# Patient Record
(demographics unavailable — no encounter records)

---

## 2022-07-23 LAB
ABO + RH BLD: ABNORMAL
ABO/RH METHOD: NORMAL
ANTIBODY SCREEN METHOD: NORMAL
APPEARANCE UR: CLEAR
BILIRUB UR STRIP.AUTO-MCNC: NEGATIVE MG/DL
BLD GP AB SCN SERPL QL: NEGATIVE
COLOR UR: NORMAL
ERYTHROCYTE [DISTWIDTH] IN BLOOD BY AUTOMATED COUNT: 12.4 % (ref 11–16)
GLUCOSE UR STRIP.AUTO-MCNC: NEGATIVE MG/DL
HCT VFR BLD AUTO: 34.3 % (ref 34–47)
HGB BLD-MCNC: 11.2 G/DL (ref 11.5–15.7)
KETONES UR STRIP.AUTO-MCNC: NEGATIVE MG/DL
LEUKOCYTE ESTERASE UR QL STRIP: NEGATIVE
MCH RBC QN AUTO: 27.1 PG (ref 27–34.5)
MCHC RBC AUTO-ENTMCNC: 32.7 G/DL (ref 32–36)
MCV RBC AUTO: 83.1 FL (ref 81–99)
NITRITE UR QL STRIP.AUTO: NEGATIVE
PH UR STRIP.AUTO: 7 [PH] (ref 4.5–8)
PLATELET # BLD AUTO: 241 X10E3/MCL (ref 140–440)
PMV BLD AUTO: 11.3 FL (ref 7.2–13.2)
POS CONTROL FSC: ABNORMAL
PROT UR QL STRIP: NEGATIVE
RBC # BLD AUTO: 4.13 X10E6/MCL (ref 3.6–5.2)
RBC # UR STRIP: NEGATIVE /UL
SP GR UR STRIP: 1.01 (ref 1–1.03)
UROBILINOGEN UR STRIP-MCNC: 0.2 EU/DL
WBC # BLD AUTO: 9.6 X10E3/MCL (ref 3.8–10.6)

## 2022-07-25 LAB
HCT VFR BLD AUTO: 38 % (ref 34–47)
HGB BLD-MCNC: 12.4 G/DL (ref 11.5–15.7)

## 2022-10-18 NOTE — DISCHARGE SUMMARY
[address] [place] [address] 399  Post-Acute Care Transfer Instructions  PERSON INFORMATION  Name: Elsie Headley  MRN: [id number]    FIN#: [id number]  PHYSICIANS  Admitting Physician: Stephie MOREAU  Attending Physician: Stephie MOREAU  PCP: Stephie MOREAU  Discharge Diagnosis:  37 weeks gestation of pregnancy; Abnormal fetal ultrasound; Intrauterine growth restriction (IUGR) affecting care of mother, third trimester, single gestation;  (normal spontaneous vaginal delivery)  Comment:     PATIENT EDUCATION INFORMATION  Instructions:    Medication Leaflets: Follow-up:              With: Address: When:   see primary ob in 6w  In 1 day                       Type Location Start [facility] Cervidil Induction - Insertion  L&D  10:00 PM  10:30 PM Confirmed   -OB Cervidil Induction - Induction  L&D  6:00 AM  6:30 AM Confirmed          MEDICATION LIST  Medication Reconciliation at Discharge:          [id number], [address] [place] [address] [phone], [phone]  ibuprofen (ibuprofen 800 mg oral tablet) 1 Tabs Oral (given by mouth) every 8 hours (interval). Refills: 0. Last Dose:____________________  Medications that have not changed  Other Medications  multivitamin, prenatal (Prenatal 1) 1 Tabs Oral (given by mouth) every day. Last Dose:____________________  No Longer Take the Following Medications  metroNIDAZOLE (metroNIDAZOLE 500 mg oral tablet) 1 Tabs Oral (given by mouth) every 12 hours for 14 Days. Refills: 0. Stop Taking Reason: Physician Request  nitrofurantoin (Macrobid 100 mg oral capsule) 1 Capsules Oral (given by mouth) 2 times a day for 7 Days. Refills: 0.   Stop Taking Reason: Physician Request         Patients Final Home Medication List Upon Discharge:          ibuprofen (ibuprofen 800 mg oral tablet) 1 Tabs Oral (given by mouth) every 8 hours (interval). Refills: 0.  multivitamin, prenatal (Prenatal 1) 1 Tabs Oral (given by mouth) every day.          Comment:     ORDERS          Order Name Order Details

## 2022-10-18 NOTE — ANESTHESIA POSTPROCEDURE EVALUATION
post op ob        Patient:   Juan Manuel Howard            MRN: [id number]            FIN: [id number]               Age:   21 years     Sex:  Female     :  1998   Associated Diagnoses:   None   Author:   Ranjan ALVARES      Postoperative Information   Post Operative Info:          Post operative day: Day 1. Patient location: pts room.        Assessment   Postanesthesia assessment   Vitals: Vital Signs   6820 1:04 EDT Systolic Blood Pressure 272 mmHg    Diastolic Blood Pressure 75 mmHg    Temperature Oral 36.7 degC    Heart Rate Monitored 90 bpm    Mean Arterial Pressure, Cuff 84 mmHg    SpO2 99 %   4995 78:64 EDT Systolic Blood Pressure 986 mmHg    Diastolic Blood Pressure 57 mmHg  LOW    Heart Rate Monitored 100 bpm    Mean Arterial Pressure, Cuff 78 mmHg    01:38 EDT Systolic Blood Pressure 867 mmHg    Diastolic Blood Pressure 75 mmHg    Heart Rate Monitored 89 bpm    Mean Arterial Pressure, Cuff 86 mmHg    74:84 EDT Systolic Blood Pressure 171 mmHg    Diastolic Blood Pressure 48 mmHg  <LLOW    Heart Rate Monitored 93 bpm    Mean Arterial Pressure, Cuff 66 mmHg  LOW    09:16 EDT Systolic Blood Pressure 195 mmHg    Diastolic Blood Pressure 71 mmHg    Heart Rate Monitored 103 bpm  HI    Mean Arterial Pressure, Cuff 83 mmHg   3805 05:89 EDT Systolic Blood Pressure 787 mmHg    Diastolic Blood Pressure 67 mmHg    Heart Rate Monitored 95 bpm    Mean Arterial Pressure, Cuff 83 mmHg   3/94/4057 79:00 EDT Systolic Blood Pressure 949 mmHg    Diastolic Blood Pressure 68 mmHg    Heart Rate Monitored 115 bpm  HI    Respiratory Rate 18 br/min    Mean Arterial Pressure, Cuff 85 mmHg    20:13 EDT Systolic Blood Pressure 304 mmHg    Diastolic Blood Pressure 62 mmHg    Heart Rate Monitored 122 bpm  HI    Mean Arterial Pressure, Cuff 82 mmHg   3/67/5134 44:41 EDT Systolic Blood Pressure 942 mmHg    Diastolic Blood Pressure 64 mmHg    Heart Rate Monitored 164 bpm  >HHI    Mean Arterial Pressure, Cuff 85 mmHg   0/06/0756 14:96 EDT Systolic Blood Pressure 412 mmHg    Diastolic Blood Pressure 77 mmHg    Heart Rate Monitored 108 bpm  HI    Mean Arterial Pressure, Cuff 96 mmHg   4/41/9673 77:88 EDT Systolic Blood Pressure 507 mmHg    Diastolic Blood Pressure 81 mmHg    Temperature Oral 36.8 degC    Heart Rate Monitored 113 bpm  HI    Mean Arterial Pressure, Cuff 92 mmHg   3/29/0131 32:52 EDT Systolic Blood Pressure 728 mmHg    Diastolic Blood Pressure 73 mmHg    Heart Rate Monitored 102 bpm  HI    Mean Arterial Pressure, Cuff 85 mmHg   9/63/4744 22:73 EDT Systolic Blood Pressure 150 mmHg    Diastolic Blood Pressure 64 mmHg    Heart Rate Monitored 93 bpm    Mean Arterial Pressure, Cuff 79 mmHg   6/61/9020 76:85 EDT Systolic Blood Pressure 974 mmHg    Diastolic Blood Pressure 73 mmHg    Heart Rate Monitored 111 bpm  HI    Mean Arterial Pressure, Cuff 89 mmHg   7/59/9290 58:81 EDT Systolic Blood Pressure 838 mmHg    Diastolic Blood Pressure 68 mmHg    Temperature Oral 36.9 degC    Heart Rate Monitored 103 bpm  HI    Mean Arterial Pressure, Cuff 88 mmHg   3/46/3085 24:48 EDT Systolic Blood Pressure 110 mmHg    Diastolic Blood Pressure 72 mmHg    Heart Rate Monitored 109 bpm  HI    Mean Arterial Pressure, Cuff 88 mmHg   1/36/8229 54:18 EDT Systolic Blood Pressure 012 mmHg    Diastolic Blood Pressure 64 mmHg    Heart Rate Monitored 103 bpm  HI    Mean Arterial Pressure, Cuff 86 mmHg   9/44/9016 91:64 EDT Systolic Blood Pressure 749 mmHg    Diastolic Blood Pressure 68 mmHg    Heart Rate Monitored 110 bpm  HI    Mean Arterial Pressure, Cuff 85 mmHg   1/88/6728 30:55 EDT Systolic Blood Pressure 969 mmHg    Diastolic Blood Pressure 62 mmHg    Heart Rate Monitored 99 bpm    Respiratory Rate 18 br/min    Mean Arterial Pressure, Cuff 77 mmHg   4/49/6665 16:88 EDT Systolic Blood Pressure 654 mmHg    Diastolic Blood Pressure 71 mmHg    Heart Rate Monitored 90 bpm    Mean Arterial Pressure, Cuff 84 mmHg   0/83/1035 43:14 EDT Systolic Blood Pressure 889 mmHg    Diastolic Blood Pressure 55 mmHg  LOW    Heart Rate Monitored 85 bpm    Mean Arterial Pressure, Cuff 73 mmHg   5/56/6627 83:03 EDT Systolic Blood Pressure 424 mmHg    Diastolic Blood Pressure 56 mmHg  LOW    Heart Rate Monitored 109 bpm  HI    Respiratory Rate 18 br/min    Mean Arterial Pressure, Cuff 66 mmHg  LOW   1/92/7701 90:45 EDT Systolic Blood Pressure 97 mmHg    Diastolic Blood Pressure 57 mmHg  LOW    Heart Rate Monitored 103 bpm  HI    Mean Arterial Pressure, Cuff 70 mmHg   0/24/1890 73:30 EDT Systolic Blood Pressure 172 mmHg    Diastolic Blood Pressure 56 mmHg  LOW    Heart Rate Monitored 90 bpm    Mean Arterial Pressure, Cuff 76 mmHg   2/80/4194 84:47 EDT Systolic Blood Pressure 465 mmHg    Diastolic Blood Pressure 84 mmHg    Temperature Oral 36.7 degC    Heart Rate Monitored 113 bpm  HI    Mean Arterial Pressure, Cuff 97 mmHg   7/57/2724 75:65 EDT Systolic Blood Pressure 982 mmHg    Diastolic Blood Pressure 70 mmHg    Heart Rate Monitored 100 bpm    Respiratory Rate 18 br/min    Mean Arterial Pressure, Cuff 88 mmHg   9/58/6985 23:81 EDT Systolic Blood Pressure 393 mmHg    Diastolic Blood Pressure 70 mmHg    Heart Rate Monitored 83 bpm    Mean Arterial Pressure, Cuff 89 mmHg   3/35/5652 79:57 EDT Systolic Blood Pressure 789 mmHg    Diastolic Blood Pressure 67 mmHg    Heart Rate Monitored 90 bpm    Mean Arterial Pressure, Cuff 87 mmHg   7/01/6257 88:01 EDT Systolic Blood Pressure 221 mmHg    Diastolic Blood Pressure 67 mmHg    Heart Rate Monitored 82 bpm    Mean Arterial Pressure, Cuff 85 mmHg   7/42/2147 73:01 EDT Systolic Blood Pressure 707 mmHg    Diastolic Blood Pressure 66 mmHg    Heart Rate Monitored 82 bpm    Respiratory Rate 18 br/min    Mean Arterial Pressure, Cuff 83 mmHg   9/16/0921 88:95 EDT Systolic Blood Pressure 518 mmHg    Diastolic Blood Pressure 75 mmHg    Heart Rate Monitored 86 bpm    Mean Arterial Pressure, Cuff 91 mmHg   3/34/0999 56:91 EDT Systolic Blood Pressure 708 mmHg    Diastolic Blood Pressure 73 mmHg    Heart Rate Monitored 86 bpm    Mean Arterial Pressure, Cuff 88 mmHg   2/36/0771 80:26 EDT Systolic Blood Pressure 224 mmHg    Diastolic Blood Pressure 74 mmHg    Heart Rate Monitored 85 bpm    Mean Arterial Pressure, Cuff 93 mmHg   6/62/3706 46:27 EDT Systolic Blood Pressure 385 mmHg    Diastolic Blood Pressure 72 mmHg    Heart Rate Monitored 100 bpm    Respiratory Rate 18 br/min    Mean Arterial Pressure, Cuff 92 mmHg   2/16/3393 19:59 EDT Systolic Blood Pressure 461 mmHg    Diastolic Blood Pressure 62 mmHg    Heart Rate Monitored 86 bpm    Mean Arterial Pressure, Cuff 80 mmHg   6/26/0755 09:74 EDT Systolic Blood Pressure 065 mmHg    Diastolic Blood Pressure 66 mmHg    Heart Rate Monitored 80 bpm    Mean Arterial Pressure, Cuff 81 mmHg   9/69/7207 03:97 EDT Systolic Blood Pressure 097 mmHg    Diastolic Blood Pressure 66 mmHg    Heart Rate Monitored 100 bpm    Mean Arterial Pressure, Cuff 81 mmHg   7/24/2022 13:47 EDT Temperature Oral 37.2 degC   3/83/1056 35:45 EDT Systolic Blood Pressure 092 mmHg    Diastolic Blood Pressure 70 mmHg    Heart Rate Monitored 104 bpm  HI    Respiratory Rate 18 br/min    Mean Arterial Pressure, Cuff 85 mmHg   4/71/4577 83:17 EDT Systolic Blood Pressure 369 mmHg    Diastolic Blood Pressure 65 mmHg    Heart Rate Monitored 98 bpm    Respiratory Rate 18 br/min    Mean Arterial Pressure, Cuff 84 mmHg   5/85/1832 86:85 EDT Systolic Blood Pressure 551 mmHg    Diastolic Blood Pressure 65 mmHg    Heart Rate Monitored 103 bpm  HI    Respiratory Rate 18 br/min    Mean Arterial Pressure, Cuff 81 mmHg   7/16/6586 22:28 EDT Systolic Blood Pressure 726 mmHg    Diastolic Blood Pressure 69 mmHg    Heart Rate Monitored 104 bpm  HI    Respiratory Rate 18 br/min    Mean Arterial Pressure, Cuff 82 mmHg   3/74/8660 83:89 EDT Systolic Blood Pressure 148 mmHg    Diastolic Blood Pressure 73 mmHg    Heart Rate Monitored 107 bpm  HI    Respiratory Rate 18 br/min    Mean Arterial Pressure, Cuff 86 mmHg   7/31/3856 24:31 EDT Systolic Blood Pressure 332 mmHg    Diastolic Blood Pressure 64 mmHg    Heart Rate Monitored 96 bpm    Respiratory Rate 18 br/min    Mean Arterial Pressure, Cuff 80 mmHg   6/07/9023 09:13 EDT Systolic Blood Pressure 213 mmHg    Diastolic Blood Pressure 67 mmHg    Heart Rate Monitored 96 bpm    Respiratory Rate 18 br/min    Mean Arterial Pressure, Cuff 86 mmHg   5/64/5665 88:29 EDT Systolic Blood Pressure 853 mmHg    Diastolic Blood Pressure 75 mmHg    Heart Rate Monitored 104 bpm  HI    Respiratory Rate 18 br/min    Mean Arterial Pressure, Cuff 89 mmHg   8/80/9945 33:38 EDT Systolic Blood Pressure 088 mmHg    Diastolic Blood Pressure 85 mmHg    Heart Rate Monitored 82 bpm    Mean Arterial Pressure, Cuff 99 mmHg   9/13/2023 01:39 EDT Systolic Blood Pressure 216 mmHg    Diastolic Blood Pressure 85 mmHg    Heart Rate Monitored 82 bpm    Respiratory Rate 18 br/min    Mean Arterial Pressure, Cuff 99 mmHg   2/86/8422 60:17 EDT Systolic Blood Pressure 805 mmHg    Diastolic Blood Pressure 76 mmHg    Heart Rate Monitored 93 bpm    Mean Arterial Pressure, Cuff 93 mmHg   7/24/2022 11:58 EDT Temperature Axillary 36.1 degC   8/65/9247 40:62 EDT Systolic Blood Pressure 558 mmHg    Diastolic Blood Pressure 76 mmHg    Heart Rate Monitored 95 bpm    Respiratory Rate 18 br/min    Mean Arterial Pressure, Cuff 89 mmHg   3/86/1537 43:12 EDT Systolic Blood Pressure 344 mmHg    Diastolic Blood Pressure 76 mmHg    Heart Rate Monitored 95 bpm    Mean Arterial Pressure, Cuff 89 mmHg   6/13/7984 51:15 EDT Systolic Blood Pressure 055 mmHg    Diastolic Blood Pressure 90 mmHg    Heart Rate Monitored 106 bpm  HI    Respiratory Rate 18 br/min    Mean Arterial Pressure, Cuff 104 mmHg  HI   9/91/7817 20:66 EDT Systolic Blood Pressure 710 mmHg    Diastolic Blood Pressure 69 mmHg    Heart Rate Monitored 106 bpm  HI    Respiratory Rate 18 br/min    Mean Arterial Pressure, Cuff 90 mmHg   8/92/8962 4:24 EDT Systolic Blood Pressure 083 mmHg    Diastolic Blood Pressure 63 mmHg    Temperature Oral 36.8 degC    Heart Rate Monitored 85 bpm    Respiratory Rate 18 br/min    Mean Arterial Pressure, Cuff 82 mmHg   4/19/4954 4:99 EDT Systolic Blood Pressure 635 mmHg    Diastolic Blood Pressure 79 mmHg    Heart Rate Monitored 87 bpm    Respiratory Rate 18 br/min    Mean Arterial Pressure, Cuff 92 mmHg   3/45/8977 9:05 EDT Systolic Blood Pressure 203 mmHg    Diastolic Blood Pressure 67 mmHg    Heart Rate Monitored 98 bpm    Mean Arterial Pressure, Cuff 83 mmHg   6/92/6576 2:31 EDT Systolic Blood Pressure 922 mmHg    Diastolic Blood Pressure 62 mmHg    Heart Rate Monitored 88 bpm    Mean Arterial Pressure, Cuff 79 mmHg   7/37/0607 4:97 EDT Systolic Blood Pressure 560 mmHg    Diastolic Blood Pressure 60 mmHg    Heart Rate Monitored 85 bpm    Mean Arterial Pressure, Cuff 76 mmHg   7/13/3487 1:76 EDT Systolic Blood Pressure 867 mmHg    Diastolic Blood Pressure 59 mmHg  LOW    Heart Rate Monitored 102 bpm  HI    Mean Arterial Pressure, Cuff 77 mmHg   4/38/4892 3:45 EDT Systolic Blood Pressure 513 mmHg    Diastolic Blood Pressure 60 mmHg    Heart Rate Monitored 99 bpm    Mean Arterial Pressure, Cuff 78 mmHg   7/24/2022 1:23 EDT Temperature Oral 36.6 degC   5/69/4109 3:53 EDT Systolic Blood Pressure 289 mmHg    Diastolic Blood Pressure 64 mmHg    Heart Rate Monitored 89 bpm    Mean Arterial Pressure, Cuff 81 mmHg   6/22/4683 0:56 EDT Systolic Blood Pressure 94 mmHg    Diastolic Blood Pressure 55 mmHg  LOW    Heart Rate Monitored 83 bpm    Mean Arterial Pressure, Cuff 69 mmHg  LOW      . Respiratory function: Respiratory rate, airway, and oxygen saturation are at adequate levels. Cardiovascular function: Heart Rate stable, Blood Pressure stable, Postoperative hydration status Adequate. Mental status: appropriate for level of anesthesia.      Temperature: within normal limits. Pain Control: Adequate. Nausea/Vomiting: Absent. Notes: pt satisfied with neuaxial anesthetic  bilat lower extrems at baseline strength and sensation.      Signature Line     Electronically Signed on 07/25/2022 06:45 AM EDT   ________________________________________________   Jennifer ALVARES

## 2022-10-18 NOTE — ANESTHESIA PREPROCEDURE EVALUATION
Preanesthesia Evaluation        Patient:   Odette Castaneda            MRN: [id number]            FIN: [id number]               Age:   21 years     Sex:  Female     :  1998   Associated Diagnoses:   None   Author:   Ebony ALVARES      Preoperative Information   Anesthesia history     Patient's history: negative. Family's history: negative. History of Present Illness   The patient presents for preanesthesia evaluation with diagnosis and symptoms as documented in full proceduralist history and physical.        Health Status   Allergies: Allergic Reactions (Selected)  Unknown  Penicillins- Unknown.,    Allergies    (Active and Proposed Allergies Only)  penicillins   (Severity: Unknown, Onset: Unknown)   Reactions: Unknown     Current medications:    Home Medications (3) Active  Macrobid 100 mg oral capsule 100 mg = 1 caps, Oral, BID  metroNIDAZOLE 500 mg oral tablet 500 mg = 1 tabs, Oral, q12hr  Prenatal 1 1 tabs, Oral, Daily  ,    Medications (28) Active  Scheduled: (8)  acetaminophen 325 mg Tab  650 mg 2 tabs, Oral, On Call  acetaminophen 500 mg Tab  1,000 mg 2 tabs, Oral, On Call  atropine-diphenoxylate 0.025 mg-2.5 mg Tab  2 tabs, Oral, On Call  atropine-diphenoxylate 0.025 mg-2.5 mg Tab  1 tabs, Oral, On Call  azithromycin 500mg/250ml NS ADM  500 mg 250 mL, IV Piggyback, On Call  oxytocin 30 units + premix sodium chloride 0.9%. .... 500 mL  500 mL, IV Bolus  promethazine 25 mg/mL Inj Soln 1 mL  50 mg 2 mL, IM, On Call  Sodium Chloride 0.9% intravenous solution 1,000 mL  1,000 mL, IV  Continuous: (3)  Dextrose 5% with 0.9% NaCl intravenous solution 1,000 mL  1,000 mL, IV, 125 mL/hr  oxytocin 30 units + premix sodium chloride 0.9%. .... 500 mL  500 mL, IV  oxytocin 30 units + premix sodium chloride 0.9%. ....  500 mL  500 mL, IV  PRN: (17)  acetaminophen 325 mg Tab  650 mg 2 tabs, Oral, q4hr  acetaminophen 500 mg Tab  1,000 mg 2 tabs, Oral, q6hr  aluminum hydroxide/magnesium hydroxide/simethicone 200 mg-200 mg-20 mg/5 mL  30 mL, Oral, q6hr  ammonia aromatic Solution  1 EA, Inhale, On Call  carboprost 250 mcg/mL Inj Soln 1 mL  250 mcg 1 mL, IM, On Call  lidocaine 1% Inj Soln 50 mL  50 mL, IM, On Call  methylergonovine 0.2 mg Tab  0.2 mg 1 tabs, Oral, q4hr  methylergonovine 0.2 mg/mL Inj Soln 1 mL  0.2 mg 1 mL, IM, On Call  mineral oil Oral Liquid 30 mL  30 mL, Topical, On Call  miSOPROStol  200 mcg Tab  800 mcg 4 tabs, OH, On Call  nalbuphine 10 mg/mL Inj Soln 1 mL  5 mg 0.5 mL, IV Push, q3hr  NIFEdipine 10 mg Cap  10 mg 1 caps, Oral, On Call  ondansetron 2 mg/mL Inj Soln 2 mL  4 mg 2 mL, IV Push, q6hr  oxytocin 10 units/mL Inj Soln 1 mL  10 units 1 mL, IM, On Call  silver nitrate Stick  1 noemi, Topical, On Call  terbutaline 1 mg/mL Inj Soln 1 mL  0.25 mg 0.25 mL, Subcutaneous, q20min  tranexamic acid 1000mg/ 100 mL NS ADM + Premix ADM sodium chloride inj. .. Iram Bloodgood Iram Bloodgood Iram Bloodgood 100 mL  1,000 mg 100 mL, IV Piggyback, On Call     Problem list:    Active Problems (3)  Bipolar 1 disorder   Fetal growth restriction   Pregnant   ,    Problems   (Active Problems Only)    Fetal growth restriction (Related to Pregnant problem)  (SNOMED CT: 1555516934, Onset: 07/20/22)   Comment:  Problem added by Discern Expert  ([address] on 07/20/22)   Pregnant   (SNOMED CT: 571364506, Onset: 02/01/22)  Bipolar I disorder   (SNOMED CT: 0549508903, Onset: --)        Histories   Past Medical History:    No active or resolved past medical history items have been selected or recorded. Procedure history:    Tooth extraction ([id number]). Comments:  7/23/2022 19:51 [place] - Caridad Barrios-RN  wisdom tooth removal   Social History        Social & Psychosocial Habits    Alcohol  03/24/2022  Use: Denies    Substance Use  03/24/2022  Use: Denies    Tobacco  03/24/2022  Use: Never (less than 100 in l    Electronic Cigarette/Vaping  07/23/2022  Electronic Cigarette Use: Former use, quit more tee    Number of Years: 6  . Symptoms of Sleep Apnea Score: PAT Documentation   0 7/23/2022 19:47 EDT   0 3/24/2022 12:11 EDT      PONV Risk Score: PAT Documentation   3 7/23/2022 19:59 EDT   3 3/24/2022 12:22 EDT         Physical Examination   Vital Signs   7/24/2022 11:58 EDT Temperature Axillary 36.1 degC   3/83/8942 20:63 EDT Systolic Blood Pressure 847 mmHg    Diastolic Blood Pressure 76 mmHg    Heart Rate Monitored 95 bpm    Respiratory Rate 18 br/min    Mean Arterial Pressure, Cuff 89 mmHg   7/37/7743 33:55 EDT Systolic Blood Pressure 587 mmHg    Diastolic Blood Pressure 76 mmHg    Heart Rate Monitored 95 bpm    Mean Arterial Pressure, Cuff 89 mmHg   8/15/1494 53:74 EDT Systolic Blood Pressure 271 mmHg    Diastolic Blood Pressure 90 mmHg    Heart Rate Monitored 106 bpm  HI    Respiratory Rate 18 br/min    Mean Arterial Pressure, Cuff 104 mmHg  HI   8/49/7322 13:65 EDT Systolic Blood Pressure 573 mmHg    Diastolic Blood Pressure 69 mmHg    Heart Rate Monitored 106 bpm  HI    Respiratory Rate 18 br/min    Mean Arterial Pressure, Cuff 90 mmHg   0/02/5458 1:21 EDT Systolic Blood Pressure 972 mmHg    Diastolic Blood Pressure 63 mmHg    Temperature Oral 36.8 degC    Heart Rate Monitored 85 bpm    Respiratory Rate 18 br/min    Mean Arterial Pressure, Cuff 82 mmHg   5/89/6713 1:13 EDT Systolic Blood Pressure 684 mmHg    Diastolic Blood Pressure 79 mmHg    Heart Rate Monitored 87 bpm    Respiratory Rate 18 br/min    Mean Arterial Pressure, Cuff 92 mmHg   0/86/6178 8:03 EDT Systolic Blood Pressure 468 mmHg    Diastolic Blood Pressure 67 mmHg    Heart Rate Monitored 98 bpm    Mean Arterial Pressure, Cuff 83 mmHg   6/44/4142 6:26 EDT Systolic Blood Pressure 569 mmHg    Diastolic Blood Pressure 62 mmHg    Heart Rate Monitored 88 bpm    Mean Arterial Pressure, Cuff 79 mmHg   6/48/6510 6:34 EDT Systolic Blood Pressure 357 mmHg    Diastolic Blood Pressure 60 mmHg    Heart Rate Monitored 85 bpm    Mean Arterial Pressure, Cuff 76 mmHg   7/24/2022 3:19 EDT Systolic Blood Pressure 389 mmHg    Diastolic Blood Pressure 59 mmHg  LOW    Heart Rate Monitored 102 bpm  HI    Mean Arterial Pressure, Cuff 77 mmHg   3/82/8963 4:61 EDT Systolic Blood Pressure 485 mmHg    Diastolic Blood Pressure 60 mmHg    Heart Rate Monitored 99 bpm    Mean Arterial Pressure, Cuff 78 mmHg   7/24/2022 1:23 EDT Temperature Oral 36.6 degC   9/40/0203 4:08 EDT Systolic Blood Pressure 765 mmHg    Diastolic Blood Pressure 64 mmHg    Heart Rate Monitored 89 bpm    Mean Arterial Pressure, Cuff 81 mmHg   2/13/2185 1:34 EDT Systolic Blood Pressure 94 mmHg    Diastolic Blood Pressure 55 mmHg  LOW    Heart Rate Monitored 83 bpm    Mean Arterial Pressure, Cuff 69 mmHg  LOW   5/89/0248 57:09 EDT Systolic Blood Pressure 735 mmHg    Diastolic Blood Pressure 62 mmHg    Heart Rate Monitored 96 bpm    Mean Arterial Pressure, Cuff 83 mmHg   3/06/0536 34:28 EDT Systolic Blood Pressure 462 mmHg    Diastolic Blood Pressure 68 mmHg    Heart Rate Monitored 84 bpm    Mean Arterial Pressure, Cuff 86 mmHg   3/83/4133 91:39 EDT Systolic Blood Pressure 182 mmHg    Diastolic Blood Pressure 65 mmHg    Heart Rate Monitored 82 bpm    Mean Arterial Pressure, Cuff 85 mmHg   2/04/8964 18:27 EDT Systolic Blood Pressure 066 mmHg    Diastolic Blood Pressure 69 mmHg    Heart Rate Monitored 94 bpm    Mean Arterial Pressure, Cuff 85 mmHg   0/27/7433 50:63 EDT Systolic Blood Pressure 398 mmHg    Diastolic Blood Pressure 71 mmHg    Heart Rate Monitored 88 bpm    Mean Arterial Pressure, Cuff 87 mmHg   6/05/4233 00:65 EDT Systolic Blood Pressure 774 mmHg    Diastolic Blood Pressure 70 mmHg    Heart Rate Monitored 85 bpm    Mean Arterial Pressure, Cuff 88 mmHg   9/34/3350 58:54 EDT Systolic Blood Pressure 715 mmHg    Diastolic Blood Pressure 71 mmHg    Temperature Axillary 37.0 degC  HI    Heart Rate Monitored 101 bpm  HI    Respiratory Rate 16 br/min    Mean Arterial Pressure, Cuff 93 mmHg         Vital Signs (last 24 hrs)_____  Last Charted___________  Temp Axillary     36.1 degC  (JUL 24 11:58)  Resp Rate         18 br/min  (JUL 24 11:30)  SBP      112 mmHg  (JUL 24 11:30)  DBP      76 mmHg  (JUL 24 11:30)  Weight      92 kg  (JUL 23 19:45)  Height      165 cm  (JUL 23 19:45)  BMI      33.79  (JUL 23 19:45)     Measurements from flowsheet : Measurements   7/23/2022 19:45 EDT Height/Length Measured 165 cm    Weight Measured 92 kg    Weight Dosing 92 kg    Pre-Pregnancy Weight 83.5 kg    Cumulative Weight Gain 9 kg    Body Mass Index Measured 33.79 kg/m2      Pain assessment:  Pain Assessment   7/24/2022 11:30 EDT Numeric Rating Pain Scale 0 = No pain    Pasero Opioid Induced Sedation Scale 1=Awake and alert    Pasero Opioid Induced Sedation Score 1   7/24/2022 10:30 EDT Numeric Rating Pain Scale 0 = No pain    Pasero Opioid Induced Sedation Scale 1=Awake and alert    Pasero Opioid Induced Sedation Score 1   7/24/2022 10:00 EDT Numeric Rating Pain Scale 0 = No pain    Pasero Opioid Induced Sedation Scale 1=Awake and alert    Pasero Opioid Induced Sedation Score 1   7/24/2022 9:10 EDT Numeric Rating Pain Scale 0 = No pain    Pasero Opioid Induced Sedation Scale 1=Awake and alert    Pasero Opioid Induced Sedation Score 1   7/24/2022 8:10 EDT Numeric Rating Pain Scale 0 = No pain    Pasero Opioid Induced Sedation Scale 1=Awake and alert    Pasero Opioid Induced Sedation Score 1   7/24/2022 7:10 EDT Numeric Rating Pain Scale 0 = No pain    Pasero Opioid Induced Sedation Scale 1=Awake and alert    Pasero Opioid Induced Sedation Score 1   7/24/2022 1:00 EDT Numeric Rating Pain Scale 0 = No pain   7/24/2022 0:32 EDT Numeric Rating Pain Scale 0 = No pain   7/23/2022 23:50 EDT Numeric Rating Pain Scale 0 = No pain   7/23/2022 22:00 EDT Numeric Rating Pain Scale 0 = No pain   7/23/2022 21:00 EDT Numeric Rating Pain Scale 0 = No pain   7/23/2022 20:08 EDT Numeric Rating Pain Scale 0 = No pain   7/23/2022 19:30 EDT Numeric Rating Pain Scale 0 = No pain .    Airway:          Mallampati classification: II (soft palate, fauces, uvula visible). Thyromental Distance: Normal.         Mouth: Teeth ( Within normal limits ). Head   Neck:  Full range of motion. Gastrointestinal:  Deferred. Musculoskeletal     Deferred. Review / Management   Results review:     Labs (Last four charted values)  WBC                  9.6 (JUL 23)   Hgb                  L 11.2 (JUL 23)   Hct                  34.3 (JUL 23)   Plt                  241 (JUL 23) , Lab results   7/23/2022 21:45 EDT UA Color Straw    UA Appear Clear    UA Glucose Negative    UA Bili Negative    UA Ketones Negative    UA Spec Grav 1.010    UA Blood Negative    UA pH 7.0    Protein U Negative    UA Urobilinogen 0.2 EU/dL    UA Nitrite Negative    UA Leuk Est Negative   7/23/2022 19:42 EDT WBC 9.6 x10e3/mcL    RBC 4.13 x10e6/mcL    Hgb 11.2 g/dL  LOW    Hct 34.3 %    MCV 83.1 fL    MCH 27.1 pg    MCHC 32.7 g/dL    RDW 12.4 %    Platelet 262 P91D3/ECO    MPV 11.3 fL    EABORh Interp A POS    ECABS Interp Negative   . Documentation reviewed:  Current records, Nurses notes. Assessment and Plan   American Society of Anesthesiologists#(ASA) physical status classification:  Class II. Anesthetic Preoperative Plan     Anesthetic technique: Neuraxial.     Regional: Epidural.     Risks discussed: nausea, vomiting, headache, hypotension, allergic reaction, serious complications.      Signature Line     Electronically Signed on 07/24/2022 01:45 PM EDT   ________________________________________________   Lucero ALVARES

## 2022-10-18 NOTE — DISCHARGE SUMMARY
[facility] Patient Summary                  [facility]  [address]  [facility]  [phone]  Patient Discharge Instructions  Name: Ananda Goodwin  Current Date: 2022 07:58:57  : 1998 MRN: [id number] FIN: UPC%>[id number]  Patient Address: [address] [place] [address]  Patient Phone: [phone]  Primary Care Provider:  Name: Tatiana MOREAU  Phone: [phone]  Immunizations Provided:   Discharge Diagnosis: 37 weeks gestation of pregnancy; Abnormal fetal ultrasound; Intrauterine growth restriction (IUGR) affecting care of mother, third trimester, single gestation;  (normal spontaneous vaginal delivery)  Discharged To: Home Treatments:   Devices/Equipment: REHAB%>  Post Hospital Services: SERVICES%>  Professional Skilled Services: SKILLED SERVICES%>  Special Services and Community Resources: AND COMM RES, ANTICIPATED%>  Mode of Discharge Transportation: TRANSPORTATION%>  Discharge Orders    Comment:   Medications  During the course of your visit, your medication list was updated with the most current information. The details of those changes are reflected below:          New Medications  [id number], [address] [place] [address] [phone], [phone]  ibuprofen (ibuprofen 800 mg oral tablet) 1 Tabs Oral (given by mouth) every 8 hours (interval). Refills: 0. Last Dose:____________________  Medications that have not changed  Other Medications  multivitamin, prenatal (Prenatal 1) 1 Tabs Oral (given by mouth) every day. Last Dose:____________________  No Longer Take the Following Medications  metroNIDAZOLE (metroNIDAZOLE 500 mg oral tablet) 1 Tabs Oral (given by mouth) every 12 hours for 14 Days. Refills: 0. Stop Taking Reason: Physician Request  nitrofurantoin (Macrobid 100 mg oral capsule) 1 Capsules Oral (given by mouth) 2 times a day for 7 Days. Refills: 0.   Stop Taking Reason: [address] [facility] would like to thank you for allowing us to assist you with your healthcare needs. The following includes patient education materials and information regarding your injury/illness. Juan Manuel Howard has been given the following list of follow-up instructions, prescriptions, and patient education materials: Follow-up Instructions:              With: Address: When:   see primary ob in 6w  In 1 day                       Type Location [facility] Cervidil Induction - Insertion BH L&D Jul/28/2022 10:00 PM Jul/28/2022 10:30 PM Confirmed   -OB Cervidil Induction - Induction BH L&D Jul/29/2022 6:00 AM Jul/29/2022 6:30 AM Confirmed          It is important to always keep an active list of medications available so that you can share with other providers and manage your medications appropriately. As an additional courtesy, we are also providing you with your final active medications list that you can keep with you. ibuprofen (ibuprofen 800 mg oral tablet) 1 Tabs Oral (given by mouth) every 8 hours (interval). Refills: 0.  multivitamin, prenatal (Prenatal 1) 1 Tabs Oral (given by mouth) every day. Take only the medications listed above. Contact your doctor prior to taking any medications not on this list.  Discharge instructions, if any, will display below  Instructions for Diet: FOR DIET%>  Instructions for Supplements: INSTRUCTIONS%>  Instructions for Activity: FOR ACTIVITY%>  Instructions for Wound Care: FOR WOUND CARE%>  Medication leaflets, if any, will display below    Patient education materials, if any, will display below       IS IT A STROKE? Act FAST and Check for these signs:  FACE          Does the face look uneven? ARM          Does one arm drift down? SPEECH     Does their speech sound strange?   TIME          Call 9-1-1 at any sign of stroke  --------------------------------------------------------------------------------------------------------------------------  Heart Attack Signs  Chest discomfort: Most heart attacks involve discomfort in the center of the chest and lasts more than a few minutes, or goes away and comes back. It can feel like uncomfortable pressure, squeezing, fullness or pain. Discomfort in upper body: Symptoms can include pain or discomfort in one or both arms, back, neck, jaw or stomach. Shortness of breath: With or without discomfort. Other signs: Breaking out in a cold sweat, nausea, or lightheaded. Remember, MINUTES DO MATTER. If you experience any of these heart attack warning signs, call 9-1-1 to get immediate medical attention!  ---------------------------------------------------------------------------------------------------------------------------  [facility] allows you to manage your health, view your test results, and retrieve your discharge documents from your hospital stay securely and conveniently from your computer. To begin the enrollment process, visit [email or web address].  Click on Sign up now under [facility].